# Patient Record
Sex: MALE | Race: BLACK OR AFRICAN AMERICAN | NOT HISPANIC OR LATINO | Employment: FULL TIME | URBAN - METROPOLITAN AREA
[De-identification: names, ages, dates, MRNs, and addresses within clinical notes are randomized per-mention and may not be internally consistent; named-entity substitution may affect disease eponyms.]

---

## 2022-01-14 ENCOUNTER — LAB (OUTPATIENT)
Dept: URGENT CARE | Facility: URGENT CARE | Age: 22
End: 2022-01-14
Attending: FAMILY MEDICINE
Payer: COMMERCIAL

## 2022-01-14 DIAGNOSIS — U07.1 COVID-19 VIRUS RNA TEST RESULT POSITIVE AT LIMIT OF DETECTION: ICD-10-CM

## 2022-01-14 LAB — SARS-COV-2 RNA RESP QL NAA+PROBE: POSITIVE

## 2022-01-14 PROCEDURE — U0005 INFEC AGEN DETEC AMPLI PROBE: HCPCS

## 2022-01-14 PROCEDURE — U0003 INFECTIOUS AGENT DETECTION BY NUCLEIC ACID (DNA OR RNA); SEVERE ACUTE RESPIRATORY SYNDROME CORONAVIRUS 2 (SARS-COV-2) (CORONAVIRUS DISEASE [COVID-19]), AMPLIFIED PROBE TECHNIQUE, MAKING USE OF HIGH THROUGHPUT TECHNOLOGIES AS DESCRIBED BY CMS-2020-01-R: HCPCS

## 2022-01-15 ENCOUNTER — TELEPHONE (OUTPATIENT)
Dept: URGENT CARE | Facility: URGENT CARE | Age: 22
End: 2022-01-15
Payer: COMMERCIAL

## 2022-01-15 NOTE — TELEPHONE ENCOUNTER
Coronavirus (COVID-19) Notification    Reason for call  Notify of POSITIVE  COVID-19 lab result, assess symptoms,  review [facility] recommendations    Lab Result   Lab test for 2019-nCoV rRt-PCR or SARS-COV-2 PCR  Oropharyngeal AND/OR nasopharyngeal swabs were POSITIVE for 2019-nCoV RNA [OR] SARS-COV-2 RNA (COVID-19) RNA     We have been unable to reach Patient by phone at this time to notify of their Positive COVID-19 result.  Left voicemail message requesting a call back to [phone] [facility] for results.        POSITIVE COVID-19 Letter sent.    Elizabeth Presley

## 2022-02-06 ENCOUNTER — HEALTH MAINTENANCE LETTER (OUTPATIENT)
Age: 22
End: 2022-02-06

## 2022-05-19 ENCOUNTER — OFFICE VISIT (OUTPATIENT)
Dept: FAMILY MEDICINE | Facility: CLINIC | Age: 22
End: 2022-05-19
Payer: COMMERCIAL

## 2022-05-19 VITALS
SYSTOLIC BLOOD PRESSURE: 130 MMHG | RESPIRATION RATE: 12 BRPM | HEART RATE: 80 BPM | BODY MASS INDEX: 21.92 KG/M2 | HEIGHT: 69 IN | TEMPERATURE: 98.2 F | WEIGHT: 148 LBS | DIASTOLIC BLOOD PRESSURE: 75 MMHG

## 2022-05-19 DIAGNOSIS — E53.8 LOW SERUM VITAMIN B12: ICD-10-CM

## 2022-05-19 DIAGNOSIS — R42 DIZZINESS: ICD-10-CM

## 2022-05-19 DIAGNOSIS — Z00.00 ROUTINE GENERAL MEDICAL EXAMINATION AT A HEALTH CARE FACILITY: Primary | ICD-10-CM

## 2022-05-19 DIAGNOSIS — Z11.59 NEED FOR HEPATITIS C SCREENING TEST: ICD-10-CM

## 2022-05-19 DIAGNOSIS — M25.562 LEFT KNEE PAIN, UNSPECIFIED CHRONICITY: ICD-10-CM

## 2022-05-19 DIAGNOSIS — K59.00 CONSTIPATION, UNSPECIFIED CONSTIPATION TYPE: ICD-10-CM

## 2022-05-19 DIAGNOSIS — R79.89 LOW SERUM VITAMIN D: ICD-10-CM

## 2022-05-19 DIAGNOSIS — M23.8X2 CREPITUS OF JOINT OF LEFT KNEE: ICD-10-CM

## 2022-05-19 DIAGNOSIS — Z11.4 SCREENING FOR HIV (HUMAN IMMUNODEFICIENCY VIRUS): ICD-10-CM

## 2022-05-19 DIAGNOSIS — I49.9 CARDIAC ARRHYTHMIA, UNSPECIFIED CARDIAC ARRHYTHMIA TYPE: ICD-10-CM

## 2022-05-19 LAB
ALBUMIN SERPL-MCNC: 4.7 G/DL (ref 3.5–5)
ALP SERPL-CCNC: 56 U/L (ref 45–120)
ALT SERPL W P-5'-P-CCNC: 11 U/L (ref 0–45)
ANION GAP SERPL CALCULATED.3IONS-SCNC: 9 MMOL/L (ref 5–18)
AST SERPL W P-5'-P-CCNC: 17 U/L (ref 0–40)
ATRIAL RATE - MUSE: 81 BPM
BILIRUB SERPL-MCNC: 0.8 MG/DL (ref 0–1)
BUN SERPL-MCNC: 5 MG/DL (ref 8–22)
CALCIUM SERPL-MCNC: 10 MG/DL (ref 8.5–10.5)
CHLORIDE BLD-SCNC: 104 MMOL/L (ref 98–107)
CO2 SERPL-SCNC: 28 MMOL/L (ref 22–31)
CREAT SERPL-MCNC: 0.8 MG/DL (ref 0.7–1.3)
DIASTOLIC BLOOD PRESSURE - MUSE: NORMAL MMHG
ERYTHROCYTE [DISTWIDTH] IN BLOOD BY AUTOMATED COUNT: 11.8 % (ref 10–15)
GFR SERPL CREATININE-BSD FRML MDRD: >90 ML/MIN/1.73M2
GLUCOSE BLD-MCNC: 99 MG/DL (ref 70–125)
HBA1C MFR BLD: 5 % (ref 0–5.6)
HCT VFR BLD AUTO: 44.4 % (ref 40–53)
HGB BLD-MCNC: 15 G/DL (ref 13.3–17.7)
HIV 1+2 AB+HIV1 P24 AG SERPL QL IA: NEGATIVE
INTERPRETATION ECG - MUSE: NORMAL
MCH RBC QN AUTO: 29.9 PG (ref 26.5–33)
MCHC RBC AUTO-ENTMCNC: 33.8 G/DL (ref 31.5–36.5)
MCV RBC AUTO: 89 FL (ref 78–100)
P AXIS - MUSE: 62 DEGREES
PLATELET # BLD AUTO: 196 10E3/UL (ref 150–450)
POTASSIUM BLD-SCNC: 3.8 MMOL/L (ref 3.5–5)
PR INTERVAL - MUSE: 150 MS
PROT SERPL-MCNC: 7.6 G/DL (ref 6–8)
QRS DURATION - MUSE: 102 MS
QT - MUSE: 362 MS
QTC - MUSE: 420 MS
R AXIS - MUSE: 28 DEGREES
RBC # BLD AUTO: 5.01 10E6/UL (ref 4.4–5.9)
SODIUM SERPL-SCNC: 141 MMOL/L (ref 136–145)
SYSTOLIC BLOOD PRESSURE - MUSE: NORMAL MMHG
T AXIS - MUSE: 65 DEGREES
TSH SERPL DL<=0.005 MIU/L-ACNC: 1.28 UIU/ML (ref 0.3–5)
VENTRICULAR RATE- MUSE: 81 BPM
VIT B12 SERPL-MCNC: 207 PG/ML (ref 213–816)
WBC # BLD AUTO: 4 10E3/UL (ref 4–11)

## 2022-05-19 PROCEDURE — 82607 VITAMIN B-12: CPT | Performed by: FAMILY MEDICINE

## 2022-05-19 PROCEDURE — 87389 HIV-1 AG W/HIV-1&-2 AB AG IA: CPT | Performed by: FAMILY MEDICINE

## 2022-05-19 PROCEDURE — 82306 VITAMIN D 25 HYDROXY: CPT | Performed by: FAMILY MEDICINE

## 2022-05-19 PROCEDURE — 93010 ELECTROCARDIOGRAM REPORT: CPT | Performed by: INTERNAL MEDICINE

## 2022-05-19 PROCEDURE — 99395 PREV VISIT EST AGE 18-39: CPT | Performed by: FAMILY MEDICINE

## 2022-05-19 PROCEDURE — 36415 COLL VENOUS BLD VENIPUNCTURE: CPT | Performed by: FAMILY MEDICINE

## 2022-05-19 PROCEDURE — 86803 HEPATITIS C AB TEST: CPT | Performed by: FAMILY MEDICINE

## 2022-05-19 PROCEDURE — 93005 ELECTROCARDIOGRAM TRACING: CPT | Performed by: FAMILY MEDICINE

## 2022-05-19 PROCEDURE — 99213 OFFICE O/P EST LOW 20 MIN: CPT | Mod: 25 | Performed by: FAMILY MEDICINE

## 2022-05-19 PROCEDURE — 83036 HEMOGLOBIN GLYCOSYLATED A1C: CPT | Performed by: FAMILY MEDICINE

## 2022-05-19 PROCEDURE — 80053 COMPREHEN METABOLIC PANEL: CPT | Performed by: FAMILY MEDICINE

## 2022-05-19 PROCEDURE — 84443 ASSAY THYROID STIM HORMONE: CPT | Performed by: FAMILY MEDICINE

## 2022-05-19 PROCEDURE — 85027 COMPLETE CBC AUTOMATED: CPT | Performed by: FAMILY MEDICINE

## 2022-05-19 RX ORDER — POLYETHYLENE GLYCOL 3350 17 G/17G
1 POWDER, FOR SOLUTION ORAL DAILY
Qty: 578 G | Refills: 1 | Status: SHIPPED | OUTPATIENT
Start: 2022-05-19

## 2022-05-19 NOTE — PROGRESS NOTES
SUBJECTIVE:   CC: Adrienne Ferreira is an 22 year old male who presents for preventative health visit.     Patient has been advised of split billing requirements and indicates understanding: Yes  HPI    Today's concerns:    Lightheadedness- started during Ramadan last month with standing up suddenly. Had one episode where his vision was black then quickly resolved. Bp initially during visit was high, 148/86.    Constipation- also started during Ramadan. Bowel movements every few days with straining. Felt some bowel urgency during fasting with no results. Patient has been taking OTC milk of magnesia. He did have indigestion during Ramadan which resolved. He enjoys eating fruit but dislikes vegetables.    Left knee- history of knee injury 3-4 years ago with pain, then at a later date was walking up the stairs when he felt a crack with pain. He watched a physical therapy on YouTube about aligning his ankle and knee which was effective and his pain resolved. Knee continues to pop and he feels vibrating sensation. He feels he has popping in multiple joints.    History of arrhythmia- noted as a child. Patient is originally from [place] and we were unable to pull up his health records. He has no exercise intolerance and has been able to run a mile in less than 6 minutes. He has noticed no notable skipping and denies chest pain.    Bilateral hand numbness- after sleep and will begin to tingle while awake as the day goes on.    Patient denies family history of health problems. He enjoys soccer and boxing. Does not like dairy products and gets Vitamin D primarily through sunlight.      Today's PHQ-2 Score:   PHQ-2 ( 1999 Pfizer) 5/19/2022   Q1: Little interest or pleasure in doing things 0   Q2: Feeling down, depressed or hopeless 0   PHQ-2 Score 0       Social History     Tobacco Use    Smoking status: Never Smoker    Smokeless tobacco: Never Used   Substance Use Topics    Alcohol use: Not on file     Last PSA: No results found  "for: PSA    History reviewed. No pertinent surgical history.       Review of Systems  GEN: no fevers or chills   ENT: no sinus concerns, normal hearing and vision; wears glasses for long distances  RESP: no cough or wheezing  CV: no dyspnea, palpitations, edema or chest pain   GI: no nausea, vomiting, diarrhea; positive for constipation   : normal urination   ENDO: no hot or cold intolerance, no polydipsia or polyuria   MS: no myalgias or arthralgias   DERM: no rash or bruising   PSYCH: no depression concerns      OBJECTIVE:   /75   Pulse 80   Temp 98.2  F (36.8  C) (Temporal)   Resp 12   Ht 1.75 m (5' 8.9\")   Wt 67.1 kg (148 lb)   BMI 21.92 kg/m      Physical Exam  GENERAL: healthy, alert and no distress  EYES: Eyes grossly normal to inspection, PERRL and conjunctivae and sclerae normal  HENT: Cerumen obstructing view of TM in left canal, normal TM in right canal, nose and mouth without ulcers or lesions  NECK: no adenopathy, no asymmetry, masses, or scars and thyroid normal to palpation  RESP: lungs clear to auscultation - no rales, rhonchi or wheezes  CV: regular rate, irregular rhythm, normal S1 S2 that increases in rate during inhalation  and slows down during exhalation, no S3 or S4, no murmur, click or rub, no peripheral edema   ABDOMEN: soft, nontender, no hepatosplenomegaly, no masses  MS: no gross musculoskeletal defects noted, no edema  SKIN: no suspicious lesions or rashes  NEURO: Normal strength and tone, mentation intact and speech normal  PSYCH: mentation appears normal, affect normal/bright    Diagnostic Test Results:  Labs reviewed in Epic  Results for orders placed or performed in visit on 05/19/22 (from the past 24 hour(s))   EKG 12-lead, tracing only   Result Value Ref Range    Systolic Blood Pressure  mmHg    Diastolic Blood Pressure  mmHg    Ventricular Rate 81 BPM    Atrial Rate 81 BPM    AL Interval 150 ms    QRS Duration 102 ms     ms    QTc 420 ms    P Axis 62 degrees    " R AXIS 28 degrees    T Axis 65 degrees    Interpretation ECG       Sinus rhythm with marked sinus arrhythmia  Otherwise normal ECG  No previous ECGs available  Confirmed by MARQUIS JAIME, LES LOC: ([id number]) on 5/19/2022 4:41:30 PM     CBC with platelets   Result Value Ref Range    WBC Count 4.0 4.0 - 11.0 10e3/uL    RBC Count 5.01 4.40 - 5.90 10e6/uL    Hemoglobin 15.0 13.3 - 17.7 g/dL    Hematocrit 44.4 40.0 - 53.0 %    MCV 89 78 - 100 fL    MCH 29.9 26.5 - 33.0 pg    MCHC 33.8 31.5 - 36.5 g/dL    RDW 11.8 10.0 - 15.0 %    Platelet Count 196 150 - 450 10e3/uL   Hemoglobin A1c   Result Value Ref Range    Hemoglobin A1C 5.0 0.0 - 5.6 %     Left knee X-ray ordered and reviewed in the office today. X-ray shows: no effusions or fractures with good joint spacing    Other labs pending.    ASSESSMENT/PLAN:   (Z00.00) Routine general medical examination at a health care facility  (primary encounter diagnosis)    (Z11.4) Screening for HIV (human immunodeficiency virus)  Plan: HIV Antigen Antibody Combo    (Z11.59) Need for hepatitis C screening test  Plan: Hepatitis C Screen Reflex to HCV RNA Quant and         Genotype    (R42) Dizziness  Comment: CBC and A1C WNL. High suspicion that his dizzy/lightheaded sensation is fasting related. Other labs pending.  Plan: CBC with platelets, Vitamin D Deficiency,         Hemoglobin A1c, Comprehensive metabolic panel         (BMP + Alb, Alk Phos, ALT, AST, Total. Bili,         TP), TSH with free T4 reflex, Vitamin B12         (M23.8X2) Crepitus of joint of left knee  Comment: Sports medicine referral made  Plan: Orthopedic  Referral    (K59.00) Constipation, unspecified constipation type  Comment: Discussed use of prn medication  Plan: polyethylene glycol (MIRALAX) 17 GM/Dose powder    (M25.562) Left knee pain, unspecified chronicity  Comment: X-ray normal as noted above  Plan: XR Knee Left 1/2 Views    (I49.9) Cardiac arrhythmia, unspecified cardiac arrhythmia type  Comment:  "Sinus arrhythmia related to respiration auscultated and confirmed with EKG- otherwise normal EKG.   Plan: EKG 12-lead, tracing only      Patient has been advised of split billing requirements and indicates understanding: Yes    COUNSELING:   Reviewed preventive health counseling, as reflected in patient instructions  Special attention given to:        Healthy diet/nutrition       Vision screening       Immunizations  Declined: COVID vaccine. Patient counseled about benefits and side effects. He will think about it. Unable to access his previous records from [place]. He will try to obtain those records, including his immunization record. Once obtained, we will review to determine which vaccines he may need.           Consider Hep C screening for all patients one time for ages 18-79 years       HIV screeninx in teen years, 1x in adult years, and at intervals if high risk    Estimated body mass index is 21.92 kg/m  as calculated from the following:    Height as of this encounter: 1.75 m (5' 8.9\").    Weight as of this encounter: 67.1 kg (148 lb).    He reports that he has never smoked. He has never used smokeless tobacco.      Counseling Resources:  ATP IV Guidelines  Pooled Cohorts Equation Calculator  FRAX Risk Assessment  ICSI Preventive Guidelines  Dietary Guidelines for Americans,   [employer]'s MyPlate  ASA Prophylaxis  Lung CA Screening    Nishi Brooks, Student NP    Tomas Lazar MD  [facility]  "

## 2022-05-20 LAB
DEPRECATED CALCIDIOL+CALCIFEROL SERPL-MC: 8 UG/L (ref 20–75)
HCV AB SERPL QL IA: NONREACTIVE

## 2022-05-23 RX ORDER — LANOLIN ALCOHOL/MO/W.PET/CERES
1000 CREAM (GRAM) TOPICAL DAILY
Qty: 90 TABLET | Refills: 2 | Status: SHIPPED | OUTPATIENT
Start: 2022-05-23

## 2022-05-23 RX ORDER — CHOLECALCIFEROL (VITAMIN D3) 50 MCG
1 TABLET ORAL DAILY
Qty: 90 TABLET | Refills: 2 | Status: SHIPPED | OUTPATIENT
Start: 2022-05-23

## 2022-06-07 NOTE — PROGRESS NOTES
"ASSESSMENT & PLAN  Patient Instructions     1. Patellofemoral pain syndrome of left knee      -Patient has left knee pain due to patellofemoral syndrome  -Patient will start home exercise program.  Handouts were given today  -Patient may return to sports and activities as his pain allows  -If patient is not improving with home exercise strengthening program.  He may call us for an order for formal physical therapy  -If patient continues to have pain or instability, return for reevaluation  -Call direct clinic number [[phone]] at any time with questions or concerns.    Albert Yeo MD [facility]  [facility]          -----    SUBJECTIVE  Adrienne Ferreira is a/an 22 year old male who is seen in consultation at the request of  Tomas Lazar M.D. for evaluation of left knee pain. The patient is seen by themselves.    Onset: 2-3 month(s) ago. Patient describes injury as patient running up the stairs and felt/hear a \"pop\" in his knee, continues to hear and have this popping sensation  Location of Pain: left knee, anterior knee   Rating of Pain at worst: 6/10  Rating of Pain Currently: 0/10  Worsened by: running, sprinting, pivoting, squatting and lunging, stairs  Better with: activity avoidance   Treatments tried: no treatment tried to date  Associated symptoms: popping, cracking, buckling, denies locking  Orthopedic history: YES - Date: 3 years ago soccer injury, rested and improved  Relevant surgical history: NO  Social history: in-between jobs right now, not working    No past medical history on file.  Social History     Socioeconomic History     Marital status: Single   Tobacco Use     Smoking status: Never Smoker     Smokeless tobacco: Never Used         Patient's past medical, surgical, social, and family histories were reviewed today and no changes are noted.    REVIEW OF SYSTEMS:  10 point ROS is negative other than symptoms noted above in HPI, Past Medical " "History or as stated below  Constitutional: NEGATIVE for fever, chills, change in weight  Skin: NEGATIVE for worrisome rashes, moles or lesions  GI/: NEGATIVE for bowel or bladder changes  Neuro: NEGATIVE for weakness, dizziness or paresthesias    OBJECTIVE:  /80   Ht 1.74 m (5' 8.5\")   Wt 67 kg (147 lb 12.8 oz)   BMI 22.15 kg/m     General: healthy, alert and in no distress  HEENT: no scleral icterus or conjunctival erythema  Skin: no suspicious lesions or rash. No jaundice.  CV: no pedal edema  Resp: normal respiratory effort without conversational dyspnea   Psych: normal mood and affect  Gait: normal steady gait with appropriate coordination and balance  Neuro: Normal light sensory exam of lower extremity  MSK:  LEFT KNEE  Inspection:    normal alignment  Palpation:   bony and ligamentous landmarks are nontender.    No effusion is present    Patellofemoral crepitus is Absent  Range of Motion:     00 extension to 1350 flexion  Strength:    Quadriceps grossly intact    Extensor mechanism intact  Special Tests:    Positive: none    Negative: Patellar grind, patellar apprehension, MCL/valgus stress (0 & 30 deg), LCL/varus stress (0 & 30 deg), Lachman's, anterior drawer, posterior drawer, Lorin's, Thessaly's    Independent visualization of the below image:  No results found for this or any previous visit (from the past 24 hour(s)).    EXAM: XR KNEE LT 1/2 VW  LOCATION: [facility]  DATE/TIME: 5/19/2022 3:55 PM     INDICATION: Left knee pain  COMPARISON: None.                                                                      IMPRESSION: Normal joint spaces and alignment. No fracture or joint effusion.        Albert Yeo MD [facility]    "

## 2022-06-08 ENCOUNTER — OFFICE VISIT (OUTPATIENT)
Dept: ORTHOPEDICS | Facility: CLINIC | Age: 22
End: 2022-06-08
Payer: COMMERCIAL

## 2022-06-08 VITALS
HEIGHT: 69 IN | DIASTOLIC BLOOD PRESSURE: 80 MMHG | BODY MASS INDEX: 21.89 KG/M2 | WEIGHT: 147.8 LBS | SYSTOLIC BLOOD PRESSURE: 112 MMHG

## 2022-06-08 DIAGNOSIS — M22.2X2 PATELLOFEMORAL PAIN SYNDROME OF LEFT KNEE: Primary | ICD-10-CM

## 2022-06-08 PROCEDURE — 99203 OFFICE O/P NEW LOW 30 MIN: CPT | Performed by: FAMILY MEDICINE

## 2022-06-08 NOTE — PATIENT INSTRUCTIONS
1. Patellofemoral pain syndrome of left knee      -Patient has left knee pain due to patellofemoral syndrome  -Patient will start home exercise program.  Handouts were given today  -Patient may return to sports and activities as his pain allows  -If patient is not improving with home exercise strengthening program.  He may call us for an order for formal physical therapy  -If patient continues to have pain or instability, return for reevaluation  -Call direct clinic number [[phone]] at any time with questions or concerns.    Albert Yeo MD [facility]  [facility] [place]

## 2022-06-08 NOTE — LETTER
"    6/8/2022         RE: Adrienne Ferreira  [address]  [address]  [place] [facility] [facility] [place] [facility] [id number]        Dear Colleague,    Thank you for referring your patient, Adrienne Ferreira, to the [facility] [place]. Please see a copy of my visit note below.    ASSESSMENT & PLAN  Patient Instructions     1. Patellofemoral pain syndrome of left knee      -Patient has left knee pain due to patellofemoral syndrome  -Patient will start home exercise program.  Handouts were given today  -Patient may return to sports and activities as his pain allows  -If patient is not improving with home exercise strengthening program.  He may call us for an order for formal physical therapy  -If patient continues to have pain or instability, return for reevaluation  -Call direct clinic number [[phone]] at any time with questions or concerns.    Albert Yeo MD [facility]  [facility]          -----    SUBJECTIVE  Adrienne Ferreira is a/an 22 year old male who is seen in consultation at the request of  Tomas Lazar M.D. for evaluation of left knee pain. The patient is seen by themselves.    Onset: 2-3 month(s) ago. Patient describes injury as patient running up the stairs and felt/hear a \"pop\" in his knee, continues to hear and have this popping sensation  Location of Pain: left knee, anterior knee   Rating of Pain at worst: 6/10  Rating of Pain Currently: 0/10  Worsened by: running, sprinting, pivoting, squatting and lunging, stairs  Better with: activity avoidance   Treatments tried: no treatment tried to date  Associated symptoms: popping, cracking, buckling, denies locking  Orthopedic history: YES - Date: 3 years ago soccer injury, rested and improved  Relevant surgical history: NO  Social history: in-between jobs right now, not working    No past medical history on file.  Social History     Socioeconomic History     Marital status: Single   Tobacco Use     " "Smoking status: Never Smoker     Smokeless tobacco: Never Used         Patient's past medical, surgical, social, and family histories were reviewed today and no changes are noted.    REVIEW OF SYSTEMS:  10 point ROS is negative other than symptoms noted above in HPI, Past Medical History or as stated below  Constitutional: NEGATIVE for fever, chills, change in weight  Skin: NEGATIVE for worrisome rashes, moles or lesions  GI/: NEGATIVE for bowel or bladder changes  Neuro: NEGATIVE for weakness, dizziness or paresthesias    OBJECTIVE:  /80   Ht 1.74 m (5' 8.5\")   Wt 67 kg (147 lb 12.8 oz)   BMI 22.15 kg/m     General: healthy, alert and in no distress  HEENT: no scleral icterus or conjunctival erythema  Skin: no suspicious lesions or rash. No jaundice.  CV: no pedal edema  Resp: normal respiratory effort without conversational dyspnea   Psych: normal mood and affect  Gait: normal steady gait with appropriate coordination and balance  Neuro: Normal light sensory exam of lower extremity  MSK:  LEFT KNEE  Inspection:    normal alignment  Palpation:   bony and ligamentous landmarks are nontender.    No effusion is present    Patellofemoral crepitus is Absent  Range of Motion:     00 extension to 1350 flexion  Strength:    Quadriceps grossly intact    Extensor mechanism intact  Special Tests:    Positive: none    Negative: Patellar grind, patellar apprehension, MCL/valgus stress (0 & 30 deg), LCL/varus stress (0 & 30 deg), Lachman's, anterior drawer, posterior drawer, Lorin's, Thessaly's    Independent visualization of the below image:  No results found for this or any previous visit (from the past 24 hour(s)).    EXAM: XR KNEE LT 1/2 VW  LOCATION: [facility]  DATE/TIME: 5/19/2022 3:55 PM     INDICATION: Left knee pain  COMPARISON: None.                                                                      IMPRESSION: Normal joint spaces and alignment. No fracture or joint " effusion.        Albert Yeo MD [facility]        Again, thank you for allowing me to participate in the care of your patient.        Sincerely,        Albert Yeo, MD

## 2022-06-27 ENCOUNTER — HOSPITAL ENCOUNTER (EMERGENCY)
Facility: CLINIC | Age: 22
Discharge: HOME | End: 2022-06-27
Attending: PHYSICIAN ASSISTANT | Admitting: PHYSICIAN ASSISTANT
Payer: COMMERCIAL

## 2022-06-27 VITALS
HEART RATE: 60 BPM | DIASTOLIC BLOOD PRESSURE: 70 MMHG | OXYGEN SATURATION: 99 % | BODY MASS INDEX: 22.96 KG/M2 | WEIGHT: 155 LBS | SYSTOLIC BLOOD PRESSURE: 124 MMHG | RESPIRATION RATE: 16 BRPM | HEIGHT: 69 IN | TEMPERATURE: 99.1 F

## 2022-06-27 DIAGNOSIS — K08.89 PAIN, DENTAL: ICD-10-CM

## 2022-06-27 DIAGNOSIS — R68.84 PAIN IN LOWER JAW: ICD-10-CM

## 2022-06-27 PROCEDURE — 99283 EMERGENCY DEPT VISIT LOW MDM: CPT

## 2022-06-27 PROCEDURE — 250N000013 HC RX MED GY IP 250 OP 250 PS 637: Performed by: PHYSICIAN ASSISTANT

## 2022-06-27 RX ORDER — ACETAMINOPHEN 500 MG
1000 TABLET ORAL ONCE
Status: COMPLETED | OUTPATIENT
Start: 2022-06-27 | End: 2022-06-27

## 2022-06-27 RX ORDER — PENICILLIN V POTASSIUM 500 MG/1
500 TABLET, FILM COATED ORAL 4 TIMES DAILY
Qty: 40 TABLET | Refills: 0 | Status: SHIPPED | OUTPATIENT
Start: 2022-06-27 | End: 2022-07-07

## 2022-06-27 RX ADMIN — ACETAMINOPHEN 1000 MG: 500 TABLET, FILM COATED ORAL at 12:00

## 2022-06-27 ASSESSMENT — ENCOUNTER SYMPTOMS
FEVER: 0
CHILLS: 0
TROUBLE SWALLOWING: 0
VOICE CHANGE: 0

## 2022-06-27 NOTE — ED NOTES
Awake alert and Oriented to person, place, time and situation.    Airway patent.    Respirations are regular and unlabored.  Patient talking in full sentences.  Patient denies cough or shortness of breath.    Pulses are strong and regular with palpation.  Skin is normal color, warm and dry.   Cap refill is less than 3 seconds.  Patient denies chest pain/pressure.    Patient new to area. Not established with dentist.     Complaints of right lower tooth pain. Notes swelling and foul taste.

## 2022-06-27 NOTE — ED TRIAGE NOTES
Triage Assessment     Row Name 06/27/22 1037       Triage Assessment (Adult)    Airway WDL WDL       Respiratory WDL    Respiratory WDL WDL       Skin Circulation/Temperature WDL    Skin Circulation/Temperature WDL WDL       Cardiac WDL    Cardiac WDL WDL       Peripheral/Neurovascular WDL    Peripheral Neurovascular WDL WDL       Cognitive/Neuro/Behavioral WDL    Cognitive/Neuro/Behavioral WDL WDL

## 2022-06-27 NOTE — DISCHARGE INSTRUCTIONS
*Soft or liquid diet.  *Take medications as prescribed.   *Take ibuprofen 600 mg 3x per day with food as needed for pain. This will provide both pain control and fight against inflammation.     *You may take 500-1000 mg of Tylenol every 6 hours for pain.  Do not exceed 3000 mg of acetaminophen (Tylenol) daily.    *Follow-up with your dentist as soon as possible.  *Return if you develop fever, difficulty opening your mouth or swallowing, Swelling under your chin or over your face, faint or feel like you will faint or become worse in any way.        Discharge Instructions  Dental Pain    You have been seen today for a toothache. Your pain may be caused by an exposed nerve, an infection (pulpitis), a root abscess, or other problems. You will need to see a dentist for a solution to your tooth problem. Emergency Department care is only to help control your problem until you can see a dentist.  Today, we did not find any sign that your toothache was caused by a serious condition, but sometimes symptoms develop over time and cannot be found during an emergency visit, so it is very important that you follow up with your dentist.      Return to the Emergency Department if:  You develop a fever over 101 degrees Fahrenheit  You can t open your mouth normally, can t move your tongue well, or can t swallow  You have new or increased swelling of your face or neck.  You develop drainage of pus or foul smelling material from around your tooth.  What can I do to help myself?  Take any antibiotic the doctor may have prescribed for you today.  Avoid very hot or very cold foods as both can cause pain.  Make an appointment to see a dentist as soon as possible. If you wish, we can provide you with a list of low-cost dental clinics.       Remember that you can always come back to the Emergency Department if you are not able to see your regular doctor in the amount of time listed above, if you get any new symptoms, or if there is anything  that worries you.

## 2022-06-27 NOTE — ED NOTES
Patient medicated as ordered. Updated on continued POC and waits. Denies additional needs.    Continue to monitor.

## 2022-06-27 NOTE — ED PROVIDER NOTES
"  History     Chief Complaint:  Dental Pain (Right lower tooth pain, swelling, bad taste , no dentist , just moved here )       PIERRE Ferreira is a 22 year old male who presents with right lower jaw pain.  He reports 2 days ago he had a piece of granola bar stuck between the tooth and used a toothpick to dislodge this.  Since that time, he has had pain to the right posterior lower molar and intermittent bleeding.  He does not have a dentist as he recently moved here.  He notes some right-sided cheek swelling.  Denies difficulty swallowing or breathing.  Denies fevers.    Allergies:  No Known Allergies     Medications:    cyanocobalamin (VITAMIN B-12) 1000 MCG tablet  polyethylene glycol (MIRALAX) 17 GM/Dose powder  vitamin D3 (CHOLECALCIFEROL) 50 mcg (2000 units) tablet      Past Medical History:    No past medical history on file.     Past Surgical History:    No past surgical history on file.     Family History:    family history is not on file.    Social History:   reports that he has never smoked. He has never used smokeless tobacco.    PCP: No Ref-Primary, Physician     Review of Systems   Constitutional: Negative for chills and fever.   HENT: Positive for dental problem. Negative for ear pain, trouble swallowing and voice change.    All other systems reviewed and are negative.      Physical Exam     Patient Vitals for the past 24 hrs:   BP Temp Temp src Pulse Resp SpO2 Height Weight   06/27/22 1033 124/70 99.1  F (37.3  C) Temporal 60 16 99 % 1.753 m (5' 9\") 70.3 kg (155 lb)        Physical Exam  General: Alert, cooperative   Head:  Scalp is atraumatic.  Eyes:  Normal conjunctiva.   ENT:                                      Ears:  The external ears are normal. TM's non-erythematous. External canals normal.    Nose:  The external nose is normal.  Throat:  The oropharynx is normal. Mucus membranes are moist. Between the right lower 2nd and 3rd molar (tooth 31 and 32) there is some bleeding. The third " molar is growing in and appears to be pushing against the second molar. No pus. No abscess. No sublingual or submandibular fullness or tenderness. Mild right cheek swelling.             Neck:  Normal range of motion.   CV:  Normal rate.   Resp:   Non-labored, no retractions or accessory muscle use.  MS:  Normal range of motion. No acute deformities.   Skin:  Warm and dry. No rash.   Neuro:  Alert. Strength and sensation grossly intact.   Psych:  Awake. Alert.  Appropriate interactions.        Emergency Department Course     Interventions:  Medications   acetaminophen (TYLENOL) tablet 1,000 mg (1,000 mg Oral Given 6/27/22 1200)        Emergency Department Course:  Past medical records, nursing notes, and vitals reviewed.  I performed an exam of the patient and obtained history, as documented above.    I rechecked the patient. Findings and plan explained to the patient. Patient was discharged.    Impression & Plan      Medical Decision Making:  Adrienne Ferreira is a 22 year old male who presents with dental pain as detailed above.  Between the right lower second and third molar there is an area of bleeding.  There is no pus and no abscess appreciated.  I discussed my concern for the third molar going into the third causing local inflammation.  There is no abscess detected around the tooth amenable to incision and drainage. There is no evidence of deep space infection of the neck or any sepsis-like syndrome.    Given I cannot rule out apical abscess or dental infection, patient be started on penicillin and emphasized importance of close follow-up with dentist.  Recommended Tylenol and ibuprofen as needed for pain and discussed proper dosing of each.  I discussed with the patient that these medications did not represent definitive care for the tooth infection and definitive care by a dentist is needed.   Provided dental resources for patient. Reasons to return were outlined including fevers, facial swelling, difficulty  opening/closing jaw, or other concerning symptoms develop. Patient agrees with the plan and all questions/concerns addressed prior to discharge home.        Diagnosis:    ICD-10-CM    1. Pain in lower jaw  R68.84    2. Pain, dental  K08.89         Discharge Medications:     Medication List      Started    penicillin V 500 MG tablet  Commonly known as: VEETID  500 mg, Oral, 4 TIMES DAILY             6/27/2022   Vanessa Lai PA-C, PA-C  06/27/22 1221

## 2022-10-03 ENCOUNTER — HEALTH MAINTENANCE LETTER (OUTPATIENT)
Age: 22
End: 2022-10-03

## 2023-01-24 ENCOUNTER — HOSPITAL ENCOUNTER (EMERGENCY)
Facility: CLINIC | Age: 23
Discharge: HOME | End: 2023-01-24
Payer: COMMERCIAL

## 2023-01-24 ENCOUNTER — HOSPITAL ENCOUNTER (EMERGENCY)
Facility: CLINIC | Age: 23
Discharge: LEFT BEFORE DISCHARGE | End: 2023-01-24
Payer: COMMERCIAL

## 2023-01-24 VITALS
RESPIRATION RATE: 16 BRPM | SYSTOLIC BLOOD PRESSURE: 132 MMHG | TEMPERATURE: 98.5 F | OXYGEN SATURATION: 100 % | DIASTOLIC BLOOD PRESSURE: 82 MMHG | HEART RATE: 102 BPM

## 2023-01-24 NOTE — ED TRIAGE NOTES
Patient BIBA with anxiety after using marijuana tonight.  He reports feeling anxious and fast heart rate.  EMS was able to talk to him en route and was able to calm him down.  He arrives calm and denies any other complaints at this time.     Triage Assessment     Row Name 01/24/23 0213       Triage Assessment (Adult)    Airway WDL WDL       Respiratory WDL    Respiratory WDL WDL       Skin Circulation/Temperature WDL    Skin Circulation/Temperature WDL WDL       Cardiac WDL    Cardiac WDL WDL       Peripheral/Neurovascular WDL    Peripheral Neurovascular WDL WDL       Cognitive/Neuro/Behavioral WDL    Cognitive/Neuro/Behavioral WDL WDL

## 2023-02-02 ENCOUNTER — OFFICE VISIT (OUTPATIENT)
Dept: FAMILY MEDICINE | Facility: CLINIC | Age: 23
End: 2023-02-02
Payer: COMMERCIAL

## 2023-02-02 VITALS
HEIGHT: 69 IN | HEART RATE: 91 BPM | TEMPERATURE: 97.4 F | WEIGHT: 131.4 LBS | SYSTOLIC BLOOD PRESSURE: 113 MMHG | RESPIRATION RATE: 16 BRPM | BODY MASS INDEX: 19.46 KG/M2 | OXYGEN SATURATION: 100 % | DIASTOLIC BLOOD PRESSURE: 66 MMHG

## 2023-02-02 DIAGNOSIS — R79.89 LOW SERUM VITAMIN D: ICD-10-CM

## 2023-02-02 DIAGNOSIS — Z00.00 ANNUAL PHYSICAL EXAM: Primary | ICD-10-CM

## 2023-02-02 DIAGNOSIS — E53.8 LOW SERUM VITAMIN B12: ICD-10-CM

## 2023-02-02 DIAGNOSIS — F41.9 ANXIETY: ICD-10-CM

## 2023-02-02 DIAGNOSIS — R00.2 PALPITATIONS: ICD-10-CM

## 2023-02-02 LAB
ALBUMIN SERPL BCG-MCNC: 4.7 G/DL (ref 3.5–5.2)
ALP SERPL-CCNC: 52 U/L (ref 40–129)
ALT SERPL W P-5'-P-CCNC: 11 U/L (ref 10–50)
ANION GAP SERPL CALCULATED.3IONS-SCNC: 12 MMOL/L (ref 7–15)
AST SERPL W P-5'-P-CCNC: 21 U/L (ref 10–50)
BASOPHILS # BLD AUTO: 0 10E3/UL (ref 0–0.2)
BASOPHILS NFR BLD AUTO: 0 %
BILIRUB SERPL-MCNC: 0.5 MG/DL
BUN SERPL-MCNC: 8.5 MG/DL (ref 6–20)
CALCIUM SERPL-MCNC: 9.9 MG/DL (ref 8.6–10)
CHLORIDE SERPL-SCNC: 106 MMOL/L (ref 98–107)
CREAT SERPL-MCNC: 0.79 MG/DL (ref 0.67–1.17)
DEPRECATED HCO3 PLAS-SCNC: 24 MMOL/L (ref 22–29)
EOSINOPHIL # BLD AUTO: 0.1 10E3/UL (ref 0–0.7)
EOSINOPHIL NFR BLD AUTO: 2 %
ERYTHROCYTE [DISTWIDTH] IN BLOOD BY AUTOMATED COUNT: 12.1 % (ref 10–15)
GFR SERPL CREATININE-BSD FRML MDRD: >90 ML/MIN/1.73M2
GLUCOSE SERPL-MCNC: 83 MG/DL (ref 70–99)
HCT VFR BLD AUTO: 45.6 % (ref 40–53)
HGB BLD-MCNC: 15.5 G/DL (ref 13.3–17.7)
IMM GRANULOCYTES # BLD: 0 10E3/UL
IMM GRANULOCYTES NFR BLD: 0 %
LYMPHOCYTES # BLD AUTO: 1.4 10E3/UL (ref 0.8–5.3)
LYMPHOCYTES NFR BLD AUTO: 36 %
MCH RBC QN AUTO: 30.1 PG (ref 26.5–33)
MCHC RBC AUTO-ENTMCNC: 34 G/DL (ref 31.5–36.5)
MCV RBC AUTO: 89 FL (ref 78–100)
MONOCYTES # BLD AUTO: 0.3 10E3/UL (ref 0–1.3)
MONOCYTES NFR BLD AUTO: 8 %
NEUTROPHILS # BLD AUTO: 2.1 10E3/UL (ref 1.6–8.3)
NEUTROPHILS NFR BLD AUTO: 53 %
PLATELET # BLD AUTO: 225 10E3/UL (ref 150–450)
POTASSIUM SERPL-SCNC: 3.9 MMOL/L (ref 3.4–5.3)
PROT SERPL-MCNC: 7.4 G/DL (ref 6.4–8.3)
RBC # BLD AUTO: 5.15 10E6/UL (ref 4.4–5.9)
SODIUM SERPL-SCNC: 142 MMOL/L (ref 136–145)
TSH SERPL DL<=0.005 MIU/L-ACNC: 1.03 UIU/ML (ref 0.3–4.2)
VIT B12 SERPL-MCNC: 232 PG/ML (ref 232–1245)
WBC # BLD AUTO: 3.9 10E3/UL (ref 4–11)

## 2023-02-02 PROCEDURE — 93000 ELECTROCARDIOGRAM COMPLETE: CPT | Performed by: PHYSICIAN ASSISTANT

## 2023-02-02 PROCEDURE — 82607 VITAMIN B-12: CPT | Performed by: PHYSICIAN ASSISTANT

## 2023-02-02 PROCEDURE — 99214 OFFICE O/P EST MOD 30 MIN: CPT | Mod: 25 | Performed by: PHYSICIAN ASSISTANT

## 2023-02-02 PROCEDURE — 82306 VITAMIN D 25 HYDROXY: CPT | Performed by: PHYSICIAN ASSISTANT

## 2023-02-02 PROCEDURE — 99395 PREV VISIT EST AGE 18-39: CPT | Performed by: PHYSICIAN ASSISTANT

## 2023-02-02 PROCEDURE — 80050 GENERAL HEALTH PANEL: CPT | Performed by: PHYSICIAN ASSISTANT

## 2023-02-02 PROCEDURE — 36415 COLL VENOUS BLD VENIPUNCTURE: CPT | Performed by: PHYSICIAN ASSISTANT

## 2023-02-02 RX ORDER — HYDROXYZINE HYDROCHLORIDE 25 MG/1
25 TABLET, FILM COATED ORAL EVERY 6 HOURS PRN
Qty: 30 TABLET | Refills: 0 | Status: SHIPPED | OUTPATIENT
Start: 2023-02-02

## 2023-02-02 ASSESSMENT — ENCOUNTER SYMPTOMS
MYALGIAS: 0
WEAKNESS: 1
SHORTNESS OF BREATH: 1
NERVOUS/ANXIOUS: 1
FREQUENCY: 1
ARTHRALGIAS: 1
PALPITATIONS: 1
HEARTBURN: 0
COUGH: 0
HEADACHES: 0
FEVER: 0
DIZZINESS: 1
SORE THROAT: 0
ABDOMINAL PAIN: 0
CHILLS: 1
CONSTIPATION: 1
DYSURIA: 1
HEMATOCHEZIA: 0
JOINT SWELLING: 0
PARESTHESIAS: 0
EYE PAIN: 0
NAUSEA: 0
DIARRHEA: 0
HEMATURIA: 0

## 2023-02-02 ASSESSMENT — PAIN SCALES - GENERAL: PAINLEVEL: NO PAIN (0)

## 2023-02-02 NOTE — PATIENT INSTRUCTIONS
[facility]:    [phone] [address]  [place] [address]

## 2023-02-02 NOTE — PROGRESS NOTES
SUBJECTIVE:   CC: Adrienne is an 22 year old who presents for preventative health visit.     Last CPE was in 2022. Originally from [place]. Moved here 3 years ago. Works for a [employer].     Emergency room evaluation on 1/19/2023.  Was using marijuana and experienced heart racing, hands going numb, and dizziness.  Brought in by ambulance.  EKG and labs were clear.  Suspected panic attack secondary to marijuana use.    Since then has had additional 2 episodes with the most recent being 2 days ago where he experienced similar anxiety/panic type symptoms, however has avoided marijuana and nicotine vape since his ER visit.  Questions whether lack of sleep/eating was the culprit for the second episode.  Not experiencing any symptoms today.    Does have a history of anxiety disorder that runs in his family.  History of childhood asthma.  Otherwise healthy.  Since his last evaluation in June 2022 has lost weight.  Was previously 155 pounds and is now 131 pounds.  States that at that time his mother moved back to [place] and has had not as good of access to home-cooked meals.  States he is eating more processed foods.    Wt Readings from Last 2 Encounters:   02/02/23 59.6 kg (131 lb 6.4 oz)   06/27/22 70.3 kg (155 lb)     Patient has been advised of split billing requirements and indicates understanding: Yes  Healthy Habits:     Getting at least 3 servings of Calcium per day:  NO    Bi-annual eye exam:  NO    Dental care twice a year:  NO    Sleep apnea or symptoms of sleep apnea:  Daytime drowsiness    Diet:  Regular (no restrictions)    Frequency of exercise:  None    Taking medications regularly:  Not Applicable    PHQ-2 Total Score: 0    Additional concerns today:  Yes      Today's PHQ-2 Score:   PHQ-2 ( 1999 [employer]) 2/2/2023   Q1: Little interest or pleasure in doing things 0   Q2: Feeling down, depressed or hopeless 0   PHQ-2 Score 0   Q1: Little interest or pleasure in doing things Not at all   Q2: Feeling  down, depressed or hopeless Not at all   PHQ-2 Score 0       Have you ever done Advance Care Planning? (For example, a Health Directive, POLST, or a discussion with a medical provider or your loved ones about your wishes): No, advance care planning information given to patient to review.  Patient plans to discuss their wishes with loved ones or provider.      Social History     Tobacco Use     Smoking status: Never     Smokeless tobacco: Never   Substance Use Topics     Alcohol use: Never     If you drink alcohol do you typically have >3 drinks per day or >7 drinks per week? No    Alcohol Use 2/2/2023   Prescreen: >3 drinks/day or >7 drinks/week? Not Applicable   Prescreen: >3 drinks/day or >7 drinks/week? -       Last PSA: No results found for: PSA    Reviewed orders with patient. Reviewed health maintenance and updated orders accordingly - Yes  Lab work is in process  Labs reviewed in EPIC  BP Readings from Last 3 Encounters:   02/02/23 113/66   01/24/23 132/82   06/27/22 124/70    Wt Readings from Last 3 Encounters:   02/02/23 59.6 kg (131 lb 6.4 oz)   06/27/22 70.3 kg (155 lb)   06/08/22 67 kg (147 lb 12.8 oz)              There is no problem list on file for this patient.    History reviewed. No pertinent surgical history.    Social History     Tobacco Use     Smoking status: Never     Smokeless tobacco: Never   Substance Use Topics     Alcohol use: Never     Family History   Problem Relation Age of Onset     Anxiety Disorder Mother      Gallbladder Disease Mother      Anxiety Disorder Sister          Current Outpatient Medications   Medication Sig Dispense Refill     hydrOXYzine (ATARAX) 25 MG tablet Take 1 tablet (25 mg) by mouth every 6 hours as needed for itching or anxiety 30 tablet 0     cyanocobalamin (VITAMIN B-12) 1000 MCG tablet Take 1 tablet (1,000 mcg) by mouth daily (Patient not taking: Reported on 2/2/2023) 90 tablet 2     polyethylene glycol (MIRALAX) 17 GM/Dose powder Take 17 g (1 capful) by  "mouth daily (Patient not taking: Reported on 2/2/2023) 578 g 1     vitamin D3 (CHOLECALCIFEROL) 50 mcg (2000 units) tablet Take 1 tablet (50 mcg) by mouth daily (Patient not taking: Reported on 2/2/2023) 90 tablet 2     No Known Allergies  Recent Labs   Lab Test 05/19/22  1600   A1C 5.0   ALT 11   CR 0.80   GFRESTIMATED >90   POTASSIUM 3.8   TSH 1.28        Reviewed and updated as needed this visit by clinical staff   Tobacco  Allergies  Meds   Med Hx  Surg Hx  Fam Hx  Soc Hx        Reviewed and updated as needed this visit by Provider   Tobacco  Allergies  Meds   Med Hx  Surg Hx  Fam Hx         History reviewed. No pertinent past medical history.   History reviewed. No pertinent surgical history.    CONSTITUTIONAL: NEGATIVE for fever, chills, change in weight  INTEGUMENTARY/SKIN: NEGATIVE for worrisome rashes, moles or lesions  EYES: NEGATIVE for vision changes or irritation  ENT: NEGATIVE for ear, mouth and throat problems  RESP: NEGATIVE for significant cough or SOB  CV: Positive for palpitations.  NEGATIVE for chest pain or peripheral edema  GI: NEGATIVE for nausea, abdominal pain, heartburn, or change in bowel habits   male: negative for dysuria, hematuria, decreased urinary stream, erectile dysfunction, urethral discharge  MUSCULOSKELETAL: NEGATIVE for significant arthralgias or myalgia  NEURO: NEGATIVE for weakness, dizziness or paresthesias  PSYCHIATRIC: Positive for anxiety.    OBJECTIVE:   /66 (BP Location: Left arm, Patient Position: Sitting, Cuff Size: Adult Regular)   Pulse 91   Temp 97.4  F (36.3  C) (Tympanic)   Resp 16   Ht 1.755 m (5' 9.09\")   Wt 59.6 kg (131 lb 6.4 oz)   SpO2 100%   BMI 19.35 kg/m      Physical Exam  GENERAL: healthy, alert and no distress  EYES: Eyes grossly normal to inspection, PERRL and conjunctivae and sclerae normal  HENT: ear canals and TM's normal, nose and mouth without ulcers or lesions  NECK: no adenopathy, no asymmetry, masses, or scars and " thyroid normal to palpation  RESP: lungs clear to auscultation - no rales, rhonchi or wheezes  CV: regular rate and rhythm, normal S1 S2, no S3 or S4, no murmur, click or rub, no peripheral edema and peripheral pulses strong  ABDOMEN: soft, nontender, no hepatosplenomegaly, no masses and bowel sounds normal  MS: no gross musculoskeletal defects noted, no edema  SKIN: no suspicious lesions or rashes  NEURO: Normal strength and tone, mentation intact and speech normal  PSYCH: mentation appears normal, affect normal/bright      ASSESSMENT/PLAN:   Adrienne was seen today for physical.    Diagnoses and all orders for this visit:    Annual physical exam    Annual physical blood work today.  Reviewed his medical history.  Plan for physical in 1 year.  See work-up below for palpitations/anxiety.  Declines immunizations at today's visit.    -     CBC with platelets and differential; Future  -     Comprehensive metabolic panel (BMP + Alb, Alk Phos, ALT, AST, Total. Bili, TP); Future  -     TSH with free T4 reflex; Future      Palpitations  Anxiety    Multifactorial problem.  Suspect underlying palpitations related to anxiety.  Happy he has decided to no longer smoke marijuana or nicotine.  Discussed healthy lifestyle and diet.  Discussed rest management.  Option in the future to see a therapist.  We will send Atarax to be used as needed for when he feels anxiety/panic symptoms coming on.  Discussed side effects of this medication.  We will search for organic causes of his palpitations and anxiety.  Twelve-lead today does reveal sinus rhythm however there is some rate variation.  He wishes to pursue Holter monitor testing which is reasonable.  Option to see cardiology in the future based on results.  Labs today.  Discussed signs and symptoms in the meantime that warrant urgent/emergent re-evaluation.  Comfortable with plan.    -     CBC with platelets and differential; Future  -     Comprehensive metabolic panel (BMP + Alb, Alk  Phos, ALT, AST, Total. Bili, TP); Future  -     TSH with free T4 reflex; Future  -     EKG 12-lead complete w/read - Clinics  -     Adult Leadless EKG Monitor 3 to 7 Days; Future  -     hydrOXYzine (ATARAX) 25 MG tablet; Take 1 tablet (25 mg) by mouth every 6 hours as needed for itching or anxiety    Low serum vitamin D    History of low vitamin D.  Recommended supplementation.  We will check levels today.    -     Vitamin D Deficiency    Low serum vitamin B12    History of low B12.  Recommended supplementation.  We will check levels today.    -     Vitamin B12    Patient has been advised of split billing requirements and indicates understanding: Yes      COUNSELING:   Reviewed preventive health counseling, as reflected in patient instructions       Regular exercise       Healthy diet/nutrition        He reports that he has never smoked. He has never used smokeless tobacco.            The likelihood of other entities in the differential is insufficient to justify any further testing for them at this time. This was explained to the patient. The patient was advised that persistent or worsening symptoms would require further evaluation. Patient advised to call the office and if unable to reach to go to the emergency room if they develop any new or worsening symptoms. Expressed understanding and agreement with above stated plan.     [facility]IDE [facility]

## 2023-02-03 LAB — DEPRECATED CALCIDIOL+CALCIFEROL SERPL-MC: 5 UG/L (ref 20–75)

## 2023-02-05 RX ORDER — ERGOCALCIFEROL 1.25 MG/1
50000 CAPSULE, LIQUID FILLED ORAL WEEKLY
Qty: 6 CAPSULE | Refills: 0 | Status: SHIPPED | OUTPATIENT
Start: 2023-02-05 | End: 2023-03-13

## 2023-02-05 NOTE — RESULT ENCOUNTER NOTE
Alex Deleon,     Your test results are below:     - Normal CBC (white blood cells, hemoglobin and platelets)    - Normal electrolytes, kidney function and liver enzymes     - Normal Thyroid function    -  Your vitamin D level did return low and as such I would recommend that we replace this with 50,000 units of ergocalciferol (vitmain D2) weekly for 6 weeks (sent to pharmacy) followed by cholecalciferol 2,000 units daily over the counter. Should re-check this in the future.     Please let me know if you have any concerns.    Melvin,    Vaughn Alcazar PA-C  [facility]

## 2023-03-14 ENCOUNTER — OFFICE VISIT (OUTPATIENT)
Dept: CARDIOLOGY | Facility: CLINIC | Age: 23
End: 2023-03-14
Payer: COMMERCIAL

## 2023-03-14 ENCOUNTER — HOSPITAL ENCOUNTER (OUTPATIENT)
Dept: CARDIOLOGY | Facility: CLINIC | Age: 23
Discharge: HOME | End: 2023-03-14
Attending: INTERNAL MEDICINE | Admitting: INTERNAL MEDICINE
Payer: COMMERCIAL

## 2023-03-14 VITALS
WEIGHT: 133 LBS | HEIGHT: 69 IN | BODY MASS INDEX: 19.7 KG/M2 | SYSTOLIC BLOOD PRESSURE: 120 MMHG | DIASTOLIC BLOOD PRESSURE: 79 MMHG | HEART RATE: 59 BPM

## 2023-03-14 DIAGNOSIS — R42 LIGHTHEADEDNESS: ICD-10-CM

## 2023-03-14 DIAGNOSIS — R07.2 PRECORDIAL PAIN: ICD-10-CM

## 2023-03-14 DIAGNOSIS — R00.2 PALPITATIONS: Primary | ICD-10-CM

## 2023-03-14 DIAGNOSIS — R00.2 PALPITATIONS: ICD-10-CM

## 2023-03-14 PROCEDURE — 93242 EXT ECG>48HR<7D RECORDING: CPT

## 2023-03-14 PROCEDURE — 93244 EXT ECG>48HR<7D REV&INTERPJ: CPT | Performed by: INTERNAL MEDICINE

## 2023-03-14 PROCEDURE — 99203 OFFICE O/P NEW LOW 30 MIN: CPT | Performed by: INTERNAL MEDICINE

## 2023-03-14 NOTE — LETTER
3/14/2023    Physician No Ref-Primary  No address on file    RE: Adrienne Ferreira       Dear Colleague,     I had the pleasure of seeing Adrienne Ferreira in the [facility].  HPI and Plan:   See dictation    Today's clinic visit entailed:    30 minutes spent on the date of the encounter doing chart review, history and exam, documentation and further activities per the note  Provider  Link to MDM Help Grid           Orders Placed This Encounter   Procedures     Follow-Up with Cardiology INOC[facility]     Adult Leadless EKG Monitor 3 to 7 Days     Echocardiogram Complete       Orders Placed This Encounter   Medications     Multiple Vitamin (MULTIVITAMIN ADULT PO)       There are no discontinued medications.      Encounter Diagnoses   Name Primary?     Palpitations Yes     Precordial pain      Lightheadedness        CURRENT MEDICATIONS:  Current Outpatient Medications   Medication Sig Dispense Refill     Multiple Vitamin (MULTIVITAMIN ADULT PO)        cyanocobalamin (VITAMIN B-12) 1000 MCG tablet Take 1 tablet (1,000 mcg) by mouth daily (Patient not taking: Reported on 2/2/2023) 90 tablet 2     hydrOXYzine (ATARAX) 25 MG tablet Take 1 tablet (25 mg) by mouth every 6 hours as needed for itching or anxiety (Patient not taking: Reported on 3/14/2023) 30 tablet 0     polyethylene glycol (MIRALAX) 17 GM/Dose powder Take 17 g (1 capful) by mouth daily (Patient not taking: Reported on 2/2/2023) 578 g 1     vitamin D3 (CHOLECALCIFEROL) 50 mcg (2000 units) tablet Take 1 tablet (50 mcg) by mouth daily (Patient not taking: Reported on 2/2/2023) 90 tablet 2       ALLERGIES   No Known Allergies    PAST MEDICAL HISTORY:  History reviewed. No pertinent past medical history.    PAST SURGICAL HISTORY:  History reviewed. No pertinent surgical history.    FAMILY HISTORY:  Family History   Problem Relation Age of Onset     Anxiety Disorder Mother      Gallbladder Disease Mother      Anxiety Disorder Sister        SOCIAL  "HISTORY:  Social History     Socioeconomic History     Marital status: Single     Spouse name: None     Number of children: None     Years of education: None     Highest education level: None   Tobacco Use     Smoking status: Former     Types: Cigarettes     Smokeless tobacco: Never     Tobacco comments:     vaping   Vaping Use     Vaping Use: Some days     Substances: Nicotine   Substance and Sexual Activity     Alcohol use: Not Currently     Drug use: Not Currently     Types: Marijuana     Sexual activity: Not Currently       Review of Systems:  Skin:          Eyes:         ENT:         Respiratory:  Positive for shortness of breath     Cardiovascular:  syncope or near-syncope;cyanosis Positive for;palpitations;lightheadedness;dizziness R side rib pain  Gastroenterology:        Genitourinary:         Musculoskeletal:         Neurologic:         Psychiatric:  Positive for anxiety    Heme/Lymph/Imm:         Endocrine:           Physical Exam:  Vitals: /79   Pulse 59   Ht 1.753 m (5' 9\")   Wt 60.3 kg (133 lb)   BMI 19.64 kg/m      Constitutional:  cooperative;in no acute distress appears anxious      Skin:  warm and dry to the touch          Head:  normocephalic        Eyes:  pupils equal and round        Lymph:      ENT:  no pallor or cyanosis        Neck:  no carotid bruit        Respiratory:  clear to auscultation;normal symmetry         Cardiac: regular rhythm;no murmurs, gallops or rubs detected   S4            pulses full and equal                                        GI:  abdomen soft;no bruits        Extremities and Muscular Skeletal:  no deformities, clubbing, cyanosis, erythema observed;no edema              Neurological:  no gross motor deficits;affect appropriate        Psych:  Alert and Oriented x 3        CC  No referring provider defined for this encounter.                  Thank you for allowing me to participate in the care of your patient.      Sincerely,     Leisa Pendleton, "      [facility]  cc:   No referring provider defined for this encounter.

## 2023-03-14 NOTE — PROGRESS NOTES
HPI and Plan:   See dictation    Today's clinic visit entailed:    30 minutes spent on the date of the encounter doing chart review, history and exam, documentation and further activities per the note  Provider  Link to [facility] Help Grid           Orders Placed This Encounter   Procedures     Follow-Up with Cardiology INOCENCIO     Adult Leadless EKG Monitor 3 to 7 Days     Echocardiogram Complete       Orders Placed This Encounter   Medications     Multiple Vitamin (MULTIVITAMIN ADULT PO)       There are no discontinued medications.      Encounter Diagnoses   Name Primary?     Palpitations Yes     Precordial pain      Lightheadedness        CURRENT MEDICATIONS:  Current Outpatient Medications   Medication Sig Dispense Refill     Multiple Vitamin (MULTIVITAMIN ADULT PO)        cyanocobalamin (VITAMIN B-12) 1000 MCG tablet Take 1 tablet (1,000 mcg) by mouth daily (Patient not taking: Reported on 2/2/2023) 90 tablet 2     hydrOXYzine (ATARAX) 25 MG tablet Take 1 tablet (25 mg) by mouth every 6 hours as needed for itching or anxiety (Patient not taking: Reported on 3/14/2023) 30 tablet 0     polyethylene glycol (MIRALAX) 17 GM/Dose powder Take 17 g (1 capful) by mouth daily (Patient not taking: Reported on 2/2/2023) 578 g 1     vitamin D3 (CHOLECALCIFEROL) 50 mcg (2000 units) tablet Take 1 tablet (50 mcg) by mouth daily (Patient not taking: Reported on 2/2/2023) 90 tablet 2       ALLERGIES   No Known Allergies    PAST MEDICAL HISTORY:  History reviewed. No pertinent past medical history.    PAST SURGICAL HISTORY:  History reviewed. No pertinent surgical history.    FAMILY HISTORY:  Family History   Problem Relation Age of Onset     Anxiety Disorder Mother      Gallbladder Disease Mother      Anxiety Disorder Sister        SOCIAL HISTORY:  Social History     Socioeconomic History     Marital status: Single     Spouse name: None     Number of children: None     Years of education: None     Highest education level: None   Tobacco  "Use     Smoking status: Former     Types: Cigarettes     Smokeless tobacco: Never     Tobacco comments:     vaping   Vaping Use     Vaping Use: Some days     Substances: Nicotine   Substance and Sexual Activity     Alcohol use: Not Currently     Drug use: Not Currently     Types: Marijuana     Sexual activity: Not Currently       Review of Systems:  Skin:          Eyes:         ENT:         Respiratory:  Positive for shortness of breath     Cardiovascular:  syncope or near-syncope;cyanosis Positive for;palpitations;lightheadedness;dizziness R side rib pain  Gastroenterology:        Genitourinary:         Musculoskeletal:         Neurologic:         Psychiatric:  Positive for anxiety    Heme/Lymph/Imm:         Endocrine:           Physical Exam:  Vitals: /79   Pulse 59   Ht 1.753 m (5' 9\")   Wt 60.3 kg (133 lb)   BMI 19.64 kg/m      Constitutional:  cooperative;in no acute distress appears anxious      Skin:  warm and dry to the touch          Head:  normocephalic        Eyes:  pupils equal and round        Lymph:      ENT:  no pallor or cyanosis        Neck:  no carotid bruit        Respiratory:  clear to auscultation;normal symmetry         Cardiac: regular rhythm;no murmurs, gallops or rubs detected   S4            pulses full and equal                                        GI:  abdomen soft;no bruits        Extremities and Muscular Skeletal:  no deformities, clubbing, cyanosis, erythema observed;no edema              Neurological:  no gross motor deficits;affect appropriate        Psych:  Alert and Oriented x 3        CC  No referring provider defined for this encounter.              "

## 2023-03-14 NOTE — PROGRESS NOTES
Service Date: 03/14/2023    CLINIC NEW PATIENT VISIT    REFERRING PROVIDER:  Not listed.    HISTORY OF PRESENT ILLNESS:  Mr. Ferreira is a pleasant 22-year-old male self-referred for symptoms of palpitations.  He stated he had a diagnosis of an arrhythmia when he lived in [place] several years ago.  He does not recall the name of the arrhythmia.  Since January, he has been experiencing increased palpitations, which he describes as heart pounding and racing with sudden starting and stopping.  It can last up to 5 minutes.  He has associated lightheadedness and .  He does not note any exacerbating triggering factors and he does not note any relieving factors.  He has no appreciable health history.  He did state he has not been exercising because of a knee injury and he is trying to improve on his diet and he has stopped caffeine.  He did have an ER visit for this which I reviewed from [facility].  It was at [facility] in January.  Unfortunately, their documentation does not really suggest a diagnosis but I was able to read the ER physician felt his EKG was normal.  I have an EKG to look from 02/02/2023.  This shows a normal sinus rhythm with sinus arrhythmia.  There is a previous EKG from 05/2022 as well which shows a much more prominent sinus arrhythmia but no other EKG changes and QT interval is normal.    PHYSICAL EXAMINATION:    VITAL SIGNS:  Today, his blood pressure is 120/79, pulse of 59.  Weight is 133, body mass index of 19.  CARDIOVASCULAR:  Tones today were regular.  He does have an S4.  No murmur, gallop or rub was appreciated.  RESPIRATORY:  Lung fields are clear.  EXTREMITIES:  He has no peripheral edema.    SUMMARY:  Mr. Ferreira is a pleasant 22-year-old male with symptoms of palpitations concerning for arrhythmia.  He has associated lightheadedness and some chest discomfort with it.  He does not have any appreciable medical history other than a previous diagnosis of arrhythmia.  He has limited  his caffeine, he does not drink alcohol and he does not take any kind of stimulants.  I will recommend that he have a leadless heart monitor for further evaluation of his symptoms.  I will also recommend an echocardiogram to look for any structural abnormalities that may predispose him to arrhythmia.  He has already attempted to abstain from caffeine and other potential triggers.  We did discuss stimulants and diet that may be helpful to change as well.  We will see him back in clinic with the results of the above testing once complete.    Please feel free to contact me with any questions you have in regards to his care.    DO Leisa Major DO        D: 2023   T: 2023   MT: [email or web address]    Name:     RICHARD VAZQUEZShira  MRN:      [id number]        Account:      [id number]   :      2000           Service Date: 2023       Document: [id number]

## 2023-03-14 NOTE — LETTER
Date:March 15, 2023      Provider requested that no letter be sent. Do not send.       [facility]

## 2023-03-29 ENCOUNTER — HOSPITAL ENCOUNTER (OUTPATIENT)
Dept: CARDIOLOGY | Facility: CLINIC | Age: 23
Discharge: HOME | End: 2023-03-29
Attending: INTERNAL MEDICINE | Admitting: INTERNAL MEDICINE
Payer: COMMERCIAL

## 2023-03-29 DIAGNOSIS — R07.2 PRECORDIAL PAIN: ICD-10-CM

## 2023-03-29 DIAGNOSIS — R42 LIGHTHEADEDNESS: ICD-10-CM

## 2023-03-29 DIAGNOSIS — R00.2 PALPITATIONS: ICD-10-CM

## 2023-03-29 LAB — LVEF ECHO: NORMAL

## 2023-03-29 PROCEDURE — 93306 TTE W/DOPPLER COMPLETE: CPT | Mod: 26 | Performed by: INTERNAL MEDICINE

## 2023-03-29 PROCEDURE — 93306 TTE W/DOPPLER COMPLETE: CPT

## 2023-05-03 ENCOUNTER — VIRTUAL VISIT (OUTPATIENT)
Dept: CARDIOLOGY | Facility: CLINIC | Age: 23
End: 2023-05-03
Attending: INTERNAL MEDICINE
Payer: COMMERCIAL

## 2023-05-03 DIAGNOSIS — R42 LIGHTHEADEDNESS: ICD-10-CM

## 2023-05-03 DIAGNOSIS — R00.2 PALPITATIONS: ICD-10-CM

## 2023-05-03 DIAGNOSIS — R07.2 PRECORDIAL PAIN: ICD-10-CM

## 2023-05-03 PROCEDURE — 99213 OFFICE O/P EST LOW 20 MIN: CPT | Mod: VID | Performed by: INTERNAL MEDICINE

## 2023-05-03 NOTE — PROGRESS NOTES
Service Date: 2023    HISTORY OF PRESENT ILLNESS:  Mr. Ferreira is a pleasant 23-year-old male, who I initially saw in mid-March for symptoms of palpitations and chest discomfort.  I had suggested a leadless heart monitor as well as an echocardiogram for further evaluation.  He was scheduled for a follow-up visit today.  Since our last visit, his symptoms have improved, but not completely gone away.  He wonders if it is anxiety related.  I did review his echocardiogram, which shows a structurally normal heart and his leadless heart monitor showed no arrhythmias.  In fact, very, very few ectopic beats at all during the 7 days he wore it.  I did review those test results with him.  After further discussion, he feels that his symptoms may be more GI related and has decided to follow up with his primary care.  I have suggested a trial of Pepcid for his chest pain symptoms as well.  He was unable to report his blood pressure or heart rate today.    ASSESSMENT/PLAN:  In summary, Mr. Ferreira is a pleasant 23-year-old male with atypical chest pain symptoms, likely noncardiac palpitations.  He has cut back on his caffeine, tried to change his diet and has had partial relief of his symptoms.  His echo and leadless heart monitor are very reassuring.  He is going to follow up with his primary care provider for possible other etiologies to his ongoing chest discomfort.    I am happy to see him back at any time for any of his future cardiovascular needs.  Please feel free to contact me with any questions you have in regards to his care.    DO Leisa Major DO        D: 2023   T: 2023   MT: al    Name:     RICHARD FERREIRA  MRN:      [id number]        Account:      [id number]   :      2000           Service Date: 2023       Document: [id number]

## 2023-05-03 NOTE — PROGRESS NOTES
"Adrienne is a 23 year old who is being evaluated via a billable video visit.    Vitals - Patient Reported  Diastolic (Patient Reported):  (N/A)  Weight (Patient Reported): 60.3 kg (133 lb)  Height (Patient Reported): 175.3 cm (5' 9\")  BMI (Based on Pt Reported Ht/Wt): 19.64      Review Of Systems  Skin: NEGATIVE  Eyes:Ears/Nose/Throat: NEGATIVE  Respiratory: NEGATIVE  Cardiovascular: chest pain, occ, not as extreme as before, energy level good  Gastrointestinal: NEGATIVE  Genitourinary:NEGATIVE   Musculoskeletal: NEGATIVE  Neurologic: NEGATIVE  Psychiatric: NEGATIVE  Hematologic/Lymphatic/Immunologic: NEGATIVE  Endocrine:  NEGATIVE    Shara Mendoza LPN    How would you like to obtain your AVS? MyChart  If the video visit is dropped, the invitation should be resent by: Text to cell phone: [phone]  Will anyone else be joining your video visit? No      PHONE VISIT AS PATIENT WAS DRIVING.  "

## 2023-05-03 NOTE — LETTER
"5/3/2023    Physician No Ref-Primary  No address on file    RE: Adrienne Ferreira       Dear Colleague,     I had the pleasure of seeing Adrienne Ferreira in the [facility].  Adrienne is a 23 year old who is being evaluated via a billable video visit.    Vitals - Patient Reported  Diastolic (Patient Reported):  (N/A)  Weight (Patient Reported): 60.3 kg (133 lb)  Height (Patient Reported): 175.3 cm (5' 9\")  BMI (Based on Pt Reported Ht/Wt): 19.64      Review Of Systems  Skin: NEGATIVE  Eyes:Ears/Nose/Throat: NEGATIVE  Respiratory: NEGATIVE  Cardiovascular: chest pain, occ, not as extreme as before, energy level good  Gastrointestinal: NEGATIVE  Genitourinary:NEGATIVE   Musculoskeletal: NEGATIVE  Neurologic: NEGATIVE  Psychiatric: NEGATIVE  Hematologic/Lymphatic/Immunologic: NEGATIVE  Endocrine:  NEGATIVE    Shara Mendoza LPN    How would you like to obtain your AVS? MyChart  If the video visit is dropped, the invitation should be resent by: Text to cell phone: [phone]  Will anyone else be joining your video visit? No      PHONE VISIT AS PATIENT WAS DRIVING.      Thank you for allowing me to participate in the care of your patient.      Sincerely,     Leisa Pendleton DO     [facility]  cc:   Leisa Pendleton DO  [address] [address]        "

## 2024-03-09 ENCOUNTER — HEALTH MAINTENANCE LETTER (OUTPATIENT)
Age: 24
End: 2024-03-09

## 2025-03-16 ENCOUNTER — HEALTH MAINTENANCE LETTER (OUTPATIENT)
Age: 25
End: 2025-03-16